# Patient Record
Sex: MALE | Race: WHITE | NOT HISPANIC OR LATINO
[De-identification: names, ages, dates, MRNs, and addresses within clinical notes are randomized per-mention and may not be internally consistent; named-entity substitution may affect disease eponyms.]

---

## 2023-05-24 PROBLEM — Z00.00 ENCOUNTER FOR PREVENTIVE HEALTH EXAMINATION: Status: ACTIVE | Noted: 2023-05-24

## 2023-05-31 ENCOUNTER — APPOINTMENT (OUTPATIENT)
Dept: ORTHOPEDIC SURGERY | Facility: CLINIC | Age: 68
End: 2023-05-31
Payer: MEDICARE

## 2023-05-31 VITALS — WEIGHT: 158 LBS | HEIGHT: 68 IN | BODY MASS INDEX: 23.95 KG/M2

## 2023-05-31 DIAGNOSIS — Z78.9 OTHER SPECIFIED HEALTH STATUS: ICD-10-CM

## 2023-05-31 PROCEDURE — 99204 OFFICE O/P NEW MOD 45 MIN: CPT

## 2023-05-31 PROCEDURE — 73080 X-RAY EXAM OF ELBOW: CPT | Mod: RT

## 2023-05-31 RX ORDER — DICLOFENAC SODIUM 1% 10 MG/G
1 GEL TOPICAL
Qty: 1 | Refills: 0 | Status: ACTIVE | COMMUNITY
Start: 2023-05-31 | End: 1900-01-01

## 2023-05-31 NOTE — PHYSICAL EXAM
[de-identified] : Neurovascularly intact distally \par \par Right Elbow:\par Tenderness over epicondyle. \par ROM pain with pronation.\par pain with supination. \par elbow pain with resisted wrist extension.\par elbow pain with resisted forearm supination.\par

## 2023-05-31 NOTE — HISTORY OF PRESENT ILLNESS
[de-identified] : The patient is a 67 year old R hand dominant M who presents today complaining of right elbow .   \par Date of Injury/Onset: intermittent for about 15 years \par Pain:    At Rest: 2/10  \par With Activity:  7/10  \par Mechanism of injury: no specific injury, reports might have strained it during pickle ball \par This is not a Work Related Injury being treated under Worker's Compensation. \par This is not an athletic injury occurring associated with an interscholastic or organized sports team. \par Quality of symptoms: swelling, aching \par Improves with: ice, advil \par Worse with: certain exercises \par Prior treatment: PT\par Prior Imaging: n/a\par Out of work/sport: _, since _ \par School/Sport/Position/Occupation:RETIRED \par Additional Information: hx of lateral epicondylitis \par

## 2023-05-31 NOTE — DISCUSSION/SUMMARY
[de-identified] : Reviewed all images with patient. \par Advised to use elbow brace on right elbow.\par Packet of exercises provided for home strengthening and/or stretching. \par Patient will follow up in 6 weeks, if not improved patient will consider PRP injection.\par \par \par \par -----------------------------------------------\par Home Exercise\par The patient is instructed on a home exercise program.\par \par RHONDA MOLINA Acting as a Scribe for Dr. Quinones\par I, Rhonda Molina, attest that this documentation has been prepared under the direction and in the presence of Provider Jean Carlos Quinones MD.\par \par Activity Modification\par The patient was advised to modify their activities.\par \par Dx / Natural History\par The patient was advised of the diagnosis.  The natural history of the pathology was explained in full to the patient in layman's terms.  Several different treatment options were discussed and explained in full to the patient including the risks and benefits of both surgical and non-surgical treatments.  All questions and concerns were answered.\par \par Pain Guide Activities\par The patient was advised to let pain guide the gradual advancement of activities.\par \par RICE\par I explained to the patient that rest, ice, compression, and elevation would benefit them.  They may return to activity after follow-up or when they no longer have any pain.\par \par The patient's current medication management of their orthopedic diagnosis was reviewed today:\par (1) We discussed a comprehensive treatment plan that included possible pharmaceutical management involving the use of prescription strength medications including but not limited to options such as oral Naprosyn 500mg BID, once daily Meloxicam 15 mg, or 500-650 mg Tylenol versus over the counter oral medications and topical prescription NSAID Pennsaid vs over the counter Voltaren gel.\par (2) There is a moderate risk of morbidity with further treatment, especially from use of prescription strength medications and possible side effects of these medications which include upset stomach with oral medications, skin reactions to topical medications and cardiac/renal issues with long term use.\par (3) I recommended that the patient follow-up with their medical physician to discuss any significant specific potential issues with long term medication use such as interactions with current medications or with exacerbation of underlying medical comorbidities.\par (4) The benefits and risks associated with use of injectable, oral or topical, prescription and over the counter anti-inflammatory medications were discussed with the patient. The patient voiced understanding of the risks including but not limited to bleeding, stroke, kidney dysfunction, heart disease, and were referred to the black box warning label for further information.

## 2023-06-21 ENCOUNTER — FORM ENCOUNTER (OUTPATIENT)
Age: 68
End: 2023-06-21

## 2023-06-22 ENCOUNTER — APPOINTMENT (OUTPATIENT)
Dept: MRI IMAGING | Facility: CLINIC | Age: 68
End: 2023-06-22
Payer: MEDICARE

## 2023-06-22 PROCEDURE — 73221 MRI JOINT UPR EXTREM W/O DYE: CPT | Mod: RT,MH

## 2023-07-05 ENCOUNTER — APPOINTMENT (OUTPATIENT)
Dept: ORTHOPEDIC SURGERY | Facility: CLINIC | Age: 68
End: 2023-07-05
Payer: MEDICARE

## 2023-07-05 VITALS — HEIGHT: 68 IN | WEIGHT: 158 LBS | BODY MASS INDEX: 23.95 KG/M2

## 2023-07-05 PROCEDURE — 99214 OFFICE O/P EST MOD 30 MIN: CPT

## 2023-07-05 NOTE — DATA REVIEWED
[FreeTextEntry1] : 5/31/23\par OC XRAY Right Elbow: 3 view:\par lat epicondyle small calfic\par \par 6/22/23\par OC MRI right elbow \par Impression: \par 1. Moderate-grade partial tearing of the common extensor tendon insertion measuring 7-8 mm with \par delamination and mild surrounding bursitis without retraction or atrophy.\par 2. Tiny subchondral cysts in the medial aspect of the radial head.

## 2023-07-05 NOTE — PHYSICAL EXAM
[de-identified] : Neurovascularly intact distally \par \par Right Elbow:\par Tenderness over epicondyle. \par ROM pain with pronation.\par pain with supination. \par elbow pain with resisted wrist extension.\par elbow pain with resisted forearm supination.\par

## 2023-07-05 NOTE — DISCUSSION/SUMMARY
[de-identified] : Discussed treatment options, the patient would like to follow through with PRP injections.\par Patient will schedule appointment to start the PRP injection series.\par  \par \par \par \par -----------------------------------------------\par Home Exercise\par The patient is instructed on a home exercise program.\par \par RHONDA MOLINA Acting as a Scribe for Dr. Quinones\par I, Rhonda Molina, attest that this documentation has been prepared under the direction and in the presence of Provider Jean Carlos Quinones MD.\par s\par Activity Modification\par The patient was advised to modify their activities.\par \par Dx / Natural History\par The patient was advised of the diagnosis.  The natural history of the pathology was explained in full to the patient in layman's terms.  Several different treatment options were discussed and explained in full to the patient including the risks and benefits of both surgical and non-surgical treatments.  All questions and concerns were answered.\par \par Pain Guide Activities\par The patient was advised to let pain guide the gradual advancement of activities.\par \par RICE\par I explained to the patient that rest, ice, compression, and elevation would benefit them.  They may return to activity after follow-up or when they no longer have any pain.\par \par The patient's current medication management of their orthopedic diagnosis was reviewed today:\par (1) We discussed a comprehensive treatment plan that included possible pharmaceutical management involving the use of prescription strength medications including but not limited to options such as oral Naprosyn 500mg BID, once daily Meloxicam 15 mg, or 500-650 mg Tylenol versus over the counter oral medications and topical prescription NSAID Pennsaid vs over the counter Voltaren gel.\par (2) There is a moderate risk of morbidity with further treatment, especially from use of prescription strength medications and possible side effects of these medications which include upset stomach with oral medications, skin reactions to topical medications and cardiac/renal issues with long term use.\par (3) I recommended that the patient follow-up with their medical physician to discuss any significant specific potential issues with long term medication use such as interactions with current medications or with exacerbation of underlying medical comorbidities.\par (4) The benefits and risks associated with use of injectable, oral or topical, prescription and over the counter anti-inflammatory medications were discussed with the patient. The patient voiced understanding of the risks including but not limited to bleeding, stroke, kidney dysfunction, heart disease, and were referred to the black box warning label for further information.

## 2023-07-19 ENCOUNTER — APPOINTMENT (OUTPATIENT)
Dept: ORTHOPEDIC SURGERY | Facility: CLINIC | Age: 68
End: 2023-07-19
Payer: MEDICARE

## 2023-07-19 VITALS — BODY MASS INDEX: 23.95 KG/M2 | WEIGHT: 158 LBS | HEIGHT: 68 IN

## 2023-07-19 PROCEDURE — 20611 DRAIN/INJ JOINT/BURSA W/US: CPT | Mod: RT

## 2023-07-19 PROCEDURE — 99214 OFFICE O/P EST MOD 30 MIN: CPT | Mod: 25

## 2023-07-19 PROCEDURE — A4565: CPT

## 2023-07-19 PROCEDURE — L3908: CPT | Mod: RT

## 2023-07-19 PROCEDURE — 005KIT: CUSTOM

## 2023-07-19 NOTE — PHYSICAL EXAM
[de-identified] : Neurovascularly intact distally \par \par Right Elbow:\par Tenderness over epicondyle. \par ROM pain with pronation.\par pain with supination. \par elbow pain with resisted wrist extension.\par elbow pain with resisted forearm supination.\par

## 2023-07-19 NOTE — DISCUSSION/SUMMARY
[de-identified] : Patient received 1 of 2 PRP injections today, well tolerated.\par Right elbow to be in sling and not in use. \par Patient is to follow up next week to received the second and last PRP injection.\par \par \par \par \par Right Elbow ultrasound guided lateral epicondyle PRP injection\par \par Platelet Rich Plasma (PRP) injection into the Lateral Epicondyle is recommended because of the severity of symptoms. The risks, benefits, and alternatives to PRP injection were explained in full to the patient. Risks outlined include but are not limited to infection, sepsis, bleeding, scarring, skin discoloration, temporary increase in pain, syncopal episode, failure to resolve symptoms, allergic reaction, symptom recurrence, and inflammation. Patient understood the risks. All questions were answered. After discussion of options, patient requested an injection. Oral informed consent was obtained and sterile prep was done of the injection site. 20cc of blood was obtained and placed in the ACP centrifuge. 5cc of PRP was drawn off using the dual syringe. Oral informed consent was obtained and sterile prep was done of the injection site. Sterile technique was used to introduce the mixture into the lateral epicondyle ECRB tendon under ultrasound guidance. Patient tolerated the procedure well. Advised to ice the injection site this evening.\par  \par \par \par -----------------------------------------------\par Home Exercise\par The patient is instructed on a home exercise program.\par \par RHONDA MOLINA Acting as a Scribe for Dr. Quinones\par I, Rhonda Molina, attest that this documentation has been prepared under the direction and in the presence of Provider Jean Carlos Quinones MD.\par s\par Activity Modification\par The patient was advised to modify their activities.\par \par Dx / Natural History\par The patient was advised of the diagnosis.  The natural history of the pathology was explained in full to the patient in layman's terms.  Several different treatment options were discussed and explained in full to the patient including the risks and benefits of both surgical and non-surgical treatments.  All questions and concerns were answered.\par \par Pain Guide Activities\par The patient was advised to let pain guide the gradual advancement of activities.\par \par RICE\par I explained to the patient that rest, ice, compression, and elevation would benefit them.  They may return to activity after follow-up or when they no longer have any pain.\par \par The patient's current medication management of their orthopedic diagnosis was reviewed today:\par (1) We discussed a comprehensive treatment plan that included possible pharmaceutical management involving the use of prescription strength medications including but not limited to options such as oral Naprosyn 500mg BID, once daily Meloxicam 15 mg, or 500-650 mg Tylenol versus over the counter oral medications and topical prescription NSAID Pennsaid vs over the counter Voltaren gel.\par (2) There is a moderate risk of morbidity with further treatment, especially from use of prescription strength medications and possible side effects of these medications which include upset stomach with oral medications, skin reactions to topical medications and cardiac/renal issues with long term use.\par (3) I recommended that the patient follow-up with their medical physician to discuss any significant specific potential issues with long term medication use such as interactions with current medications or with exacerbation of underlying medical comorbidities.\par (4) The benefits and risks associated with use of injectable, oral or topical, prescription and over the counter anti-inflammatory medications were discussed with the patient. The patient voiced understanding of the risks including but not limited to bleeding, stroke, kidney dysfunction, heart disease, and were referred to the black box warning label for further information.

## 2023-07-26 ENCOUNTER — APPOINTMENT (OUTPATIENT)
Dept: ORTHOPEDIC SURGERY | Facility: CLINIC | Age: 68
End: 2023-07-26
Payer: MEDICARE

## 2023-07-26 VITALS — BODY MASS INDEX: 23.95 KG/M2 | WEIGHT: 158 LBS | HEIGHT: 68 IN

## 2023-07-26 DIAGNOSIS — M25.521 PAIN IN RIGHT ELBOW: ICD-10-CM

## 2023-07-26 PROCEDURE — 20611 DRAIN/INJ JOINT/BURSA W/US: CPT | Mod: RT

## 2023-07-26 PROCEDURE — 005KIT: CUSTOM

## 2023-07-26 PROCEDURE — 99214 OFFICE O/P EST MOD 30 MIN: CPT | Mod: 25

## 2023-07-26 NOTE — DISCUSSION/SUMMARY
[de-identified] : Patient received 2 of 2 PRP injections today, well tolerated.\par One week rest and then pt.\par Physical therapy prescribed for strengthening and stretching.\par Patient will follow up in 6 weeks. \par \par \par \par \par Right Elbow ultrasound guided lateral epicondyle PRP injection\par \par Platelet Rich Plasma (PRP) injection into the Lateral Epicondyle is recommended because of the severity of symptoms. The risks, benefits, and alternatives to PRP injection were explained in full to the patient. Risks outlined include but are not limited to infection, sepsis, bleeding, scarring, skin discoloration, temporary increase in pain, syncopal episode, failure to resolve symptoms, allergic reaction, symptom recurrence, and inflammation. Patient understood the risks. All questions were answered. After discussion of options, patient requested an injection. Oral informed consent was obtained and sterile prep was done of the injection site. 20cc of blood was obtained and placed in the ACP centrifuge. 5cc of PRP was drawn off using the dual syringe. Oral informed consent was obtained and sterile prep was done of the injection site. Sterile technique was used to introduce the mixture into the lateral epicondyle ECRB tendon under ultrasound guidance. Patient tolerated the procedure well. Advised to ice the injection site this evening.\par  \par \par \par -----------------------------------------------\par Home Exercise\par The patient is instructed on a home exercise program.\par \par RHONDA MOLINA Acting as a Scribe for Dr. Quinones\par I, Rhonda Molina, attest that this documentation has been prepared under the direction and in the presence of Provider Jean Carlos Quinones MD.\par s\par Activity Modification\par The patient was advised to modify their activities.\par \par Dx / Natural History\par The patient was advised of the diagnosis.  The natural history of the pathology was explained in full to the patient in layman's terms.  Several different treatment options were discussed and explained in full to the patient including the risks and benefits of both surgical and non-surgical treatments.  All questions and concerns were answered.\par \par Pain Guide Activities\par The patient was advised to let pain guide the gradual advancement of activities.\par \par RICE\par I explained to the patient that rest, ice, compression, and elevation would benefit them.  They may return to activity after follow-up or when they no longer have any pain.\par \par The patient's current medication management of their orthopedic diagnosis was reviewed today:\par (1) We discussed a comprehensive treatment plan that included possible pharmaceutical management involving the use of prescription strength medications including but not limited to options such as oral Naprosyn 500mg BID, once daily Meloxicam 15 mg, or 500-650 mg Tylenol versus over the counter oral medications and topical prescription NSAID Pennsaid vs over the counter Voltaren gel.\par (2) There is a moderate risk of morbidity with further treatment, especially from use of prescription strength medications and possible side effects of these medications which include upset stomach with oral medications, skin reactions to topical medications and cardiac/renal issues with long term use.\par (3) I recommended that the patient follow-up with their medical physician to discuss any significant specific potential issues with long term medication use such as interactions with current medications or with exacerbation of underlying medical comorbidities.\par (4) The benefits and risks associated with use of injectable, oral or topical, prescription and over the counter anti-inflammatory medications were discussed with the patient. The patient voiced understanding of the risks including but not limited to bleeding, stroke, kidney dysfunction, heart disease, and were referred to the black box warning label for further information.

## 2023-07-26 NOTE — PHYSICAL EXAM
[de-identified] : Neurovascularly intact distally \par \par Right Elbow:\par Tenderness over epicondyle. \par ROM pain with pronation.\par pain with supination. \par elbow pain with resisted wrist extension.\par elbow pain with resisted forearm supination.\par

## 2023-08-22 ENCOUNTER — OFFICE (OUTPATIENT)
Dept: URBAN - METROPOLITAN AREA CLINIC 12 | Facility: CLINIC | Age: 68
Setting detail: OPHTHALMOLOGY
End: 2023-08-22
Payer: MEDICARE

## 2023-08-22 DIAGNOSIS — H25.13: ICD-10-CM

## 2023-08-22 DIAGNOSIS — H40.013: ICD-10-CM

## 2023-08-22 PROBLEM — H52.7 REFRACTIVE ERROR ; BOTH EYES: Status: ACTIVE | Noted: 2023-08-22

## 2023-08-22 PROCEDURE — 92004 COMPRE OPH EXAM NEW PT 1/>: CPT | Performed by: STUDENT IN AN ORGANIZED HEALTH CARE EDUCATION/TRAINING PROGRAM

## 2023-08-22 PROCEDURE — 92250 FUNDUS PHOTOGRAPHY W/I&R: CPT | Performed by: STUDENT IN AN ORGANIZED HEALTH CARE EDUCATION/TRAINING PROGRAM

## 2023-08-22 ASSESSMENT — REFRACTION_CURRENTRX
OD_OVR_VA: 20/
OD_CYLINDER: SPHERE
OS_ADD: +2.50
OS_OVR_VA: 20/
OD_AXIS: 0
OD_ADD: +2.50
OS_CYLINDER: -0.25
OS_SPHERE: +0.50
OD_SPHERE: +0.75
OD_VPRISM_DIRECTION: BF
OS_AXIS: 084
OS_VPRISM_DIRECTION: BF

## 2023-08-22 ASSESSMENT — REFRACTION_AUTOREFRACTION
OS_AXIS: 091
OS_SPHERE: +1.00
OD_SPHERE: +1.00
OD_CYLINDER: -0.25
OD_AXIS: 092
OS_CYLINDER: -0.50

## 2023-08-22 ASSESSMENT — SPHEQUIV_DERIVED
OS_SPHEQUIV: 0.75
OS_SPHEQUIV: 0.75
OS_SPHEQUIV: 2
OD_SPHEQUIV: 0.875
OS_SPHEQUIV: 0.75

## 2023-08-22 ASSESSMENT — CONFRONTATIONAL VISUAL FIELD TEST (CVF)
OD_FINDINGS: FULL
OS_FINDINGS: FULL

## 2023-08-22 ASSESSMENT — VISUAL ACUITY
OD_BCVA: 20/20
OS_BCVA: 20/20

## 2023-08-22 ASSESSMENT — REFRACTION_MANIFEST
OS_ADD: +2.50
OS_SPHERE: +1.00
OD_ADD: +1.25
OS_CYLINDER: -0.50
OD_CYLINDER: SPH
OS_AXIS: 090
OS_VA1: 20/20
OS_CYLINDER: -0.50
OS_SPHERE: +1.00
OS_CYLINDER: -0.50
OS_VA1: 20/20
OS_AXIS: 090
OD_AXIS: 000
OD_SPHERE: +0.75
OD_CYLINDER: SPH
OD_VA1: 20/20
OD_VA1: 20/20
OS_ADD: +1.25
OD_AXIS: 000
OD_ADD: +2.50
OD_CYLINDER: SPH
OS_ADD: +1.25
OD_ADD: +1.25
OD_SPHERE: +0.75
OD_VA1: 20/20
OS_SPHERE: +2.25
OS_AXIS: 090
OD_SPHERE: +2.00
OS_VA1: 20/20
OD_AXIS: 000

## 2023-08-22 ASSESSMENT — KERATOMETRY
OD_K1POWER_DIOPTERS: 45.00
OS_AXISANGLE_DEGREES: 168
OS_K1POWER_DIOPTERS: 44.75
OD_AXISANGLE_DEGREES: 060
OD_K2POWER_DIOPTERS: 45.50
OS_K2POWER_DIOPTERS: 45.00

## 2023-08-22 ASSESSMENT — AXIALLENGTH_DERIVED
OS_AL: 22.8207
OS_AL: 22.3739
OS_AL: 22.8207
OS_AL: 22.8207
OD_AL: 22.6476

## 2023-08-22 ASSESSMENT — TONOMETRY
OS_IOP_MMHG: 17
OD_IOP_MMHG: 16

## 2024-06-11 ENCOUNTER — OFFICE (OUTPATIENT)
Dept: URBAN - METROPOLITAN AREA CLINIC 12 | Facility: CLINIC | Age: 69
Setting detail: OPHTHALMOLOGY
End: 2024-06-11
Payer: MEDICARE

## 2024-06-11 DIAGNOSIS — H25.13: ICD-10-CM

## 2024-06-11 DIAGNOSIS — H40.013: ICD-10-CM

## 2024-06-11 DIAGNOSIS — R51.9: ICD-10-CM

## 2024-06-11 DIAGNOSIS — H52.7: ICD-10-CM

## 2024-06-11 PROCEDURE — 76514 ECHO EXAM OF EYE THICKNESS: CPT | Performed by: STUDENT IN AN ORGANIZED HEALTH CARE EDUCATION/TRAINING PROGRAM

## 2024-06-11 PROCEDURE — 92083 EXTENDED VISUAL FIELD XM: CPT | Performed by: STUDENT IN AN ORGANIZED HEALTH CARE EDUCATION/TRAINING PROGRAM

## 2024-06-11 PROCEDURE — 92014 COMPRE OPH EXAM EST PT 1/>: CPT | Performed by: STUDENT IN AN ORGANIZED HEALTH CARE EDUCATION/TRAINING PROGRAM

## 2024-06-11 PROCEDURE — 92133 CPTRZD OPH DX IMG PST SGM ON: CPT | Performed by: STUDENT IN AN ORGANIZED HEALTH CARE EDUCATION/TRAINING PROGRAM

## 2024-06-11 PROCEDURE — 92020 GONIOSCOPY: CPT | Performed by: STUDENT IN AN ORGANIZED HEALTH CARE EDUCATION/TRAINING PROGRAM

## 2024-06-11 ASSESSMENT — CONFRONTATIONAL VISUAL FIELD TEST (CVF)
OS_FINDINGS: FULL
OD_FINDINGS: FULL

## 2024-09-18 ENCOUNTER — APPOINTMENT (OUTPATIENT)
Dept: MRI IMAGING | Facility: CLINIC | Age: 69
End: 2024-09-18

## 2024-09-18 ENCOUNTER — APPOINTMENT (OUTPATIENT)
Dept: ORTHOPEDIC SURGERY | Facility: CLINIC | Age: 69
End: 2024-09-18

## 2024-09-18 DIAGNOSIS — Z00.00 ENCOUNTER FOR GENERAL ADULT MEDICAL EXAMINATION W/OUT ABNORMAL FINDINGS: ICD-10-CM

## 2024-09-18 DIAGNOSIS — M77.11 LATERAL EPICONDYLITIS, RIGHT ELBOW: ICD-10-CM

## 2024-09-18 PROCEDURE — 73010 X-RAY EXAM OF SHOULDER BLADE: CPT | Mod: LT

## 2024-09-18 PROCEDURE — 20611 DRAIN/INJ JOINT/BURSA W/US: CPT | Mod: LT

## 2024-09-18 PROCEDURE — 99214 OFFICE O/P EST MOD 30 MIN: CPT | Mod: 25

## 2024-09-18 PROCEDURE — 73080 X-RAY EXAM OF ELBOW: CPT | Mod: 50

## 2024-09-18 PROCEDURE — 73030 X-RAY EXAM OF SHOULDER: CPT | Mod: LT

## 2024-09-18 NOTE — HISTORY OF PRESENT ILLNESS
[de-identified] : 09/18/2024: The patient is a 69 year old M, right hand dominant who presents today complaining of bilateral elbow and LT shoulder pain.  Date of Injury/Onset: Left- 6-8 months, Right- 2023, had prior intermittent pain for about 16 years  Pain:  At Rest: 6/10 With Activity:  7-10/10 Mechanism of injury: Gradual onset  This is not a Work Related Injury being treated under Worker's Compensation. This is not an athletic injury occurring associated with an interscholastic or organized sports team. Quality of symptoms: Tender, swelling, turmeric supplement,   Improves with: Advil,  Worse with: Push ups, weight bearing, sleeping  Prior treatment: PRP x2, PT- right elbow, Denies any prior treatment for the left elbow.  Prior imaging: XR of right elbow- 05/31/23 MRI- right elbow 06/22/23 Previous injury: None  Out of work/sport: kayaking and working out has been delayed due to the pain.  School/Sport/Position/Occupation: Retired  Additional Information: HX of left shoulder separation on 11/25/22 patient fell while playing pickleball. Plays pickleball, bike ride, walking/ hiking,

## 2024-09-18 NOTE — DISCUSSION/SUMMARY
[de-identified] : Bilateral X-Ray Examination of the ELBOW 3 views: no fractures, subluxations or dislocations.   Assessment:   The patient is a 69 year old male with physical exam findings consistent with BILATERAL LATERAL EPICONDYLITIS    - We discussed their diagnosis and treatment options at length including the fact that the majority of patients get better at one year; however, for those small percentage that don't, they can be helped with a surgical intervention. - We will move forward with icing, and anti-inflammatory medication  - They will modify their activities over the next 4-6 weeks. - We also discussed the possibility of a corticosteroid injection and will move forward with this for the LT ELBOW today. Patient tolerated well.  - Patient discussed wanting SX in RT ELBOW due to LE and bone spur in RT ELBOW only and will consider this but decided to wait at this time.  - R/B/A to surgical and non-surgical interventions were discussed in length.  - MRI ordered for RT ELBOW   - Follow up with MRI        LEFT shoulder X-ray examination of  (3-4 views): no fractures or dislocations;  AC joint arthrosis; Type 2 acromion; subacromial spur; joint space narrowing; inferior humeral head osteophyte    Assessment: The patient is a 69 year old male with physical exam findings consistent with LEFT SHOULDER OSTEOARTHRITIS/ROTATOR CUFF ARTHROPATHY, *HAMADA GRADE 1A/1B/2A/2B*     The natural progression of osteoarthritis was explained to the patient.  We discussed the possible treatment options from conservative to operative.  These included NSAIDS, Glucosamine and Chondrotin sulfate, and Physical Therapy as well different types of injections.  We also discussed that at some point they may progress to needing a TSA.  Information and pamphlets were given.     We discussed their diagnosis and treatment options at length including surgical and non-surgical options. We will first attempt conservative treatment with activity modification, HEP, icing, weight loss, and anti-inflammatory medications. We discussed the possible of injections (steroid and Visco supplementation) in the future. The patient was provided with a HEP to work on ROM, RTC strengthening, shoulder stretches and strengthening, and other exercises on the Shoulder Arthritis Protocol.     Dx / Natural History: The patient was advised of the diagnosis.  The natural history of the pathology was explained in full to the patient in layman's terms.  Several different treatment options were discussed and explained in full to the patient including the risks and benefits of both surgical and non-surgical treatments.  All questions and concerns were answered.       Pain Guide Activities: The patient was advised to let pain guide the gradual advancement of activities.          Icing: The patient was advised to apply ice (wrapped in a towel or protective covering) to the area daily (20 minutes at a time, 2-4X/day).       Follow up in 4-6 weeks to re-evaluate.

## 2024-09-18 NOTE — IMAGING
[de-identified] : LEFT SHOULDER Inspection: No swelling.  Palpation: Tenderness is noted at the bicipital groove, anterior and lateral.  Range of motion: There is pain with range of motion. , ER 55, @90ER 90, @90IR 30 Strength: There is pain, weakness, and discomfort with strength testing. Forward Flexion 3/5. Abduction 3/5. External Rotation 4/5 and Internal Rotation 5-/5  Neurological testings: motor and sensor intact distally. Ligament Stability and Special Tests:  There is positive arc of pain.  Shoulder apprehension: neg Shoulder relocation: neg Obriens test: pos Biceps Active test: neg Rivas Labral Shear: neg Impingement testing: pos Martha testing: pos Whipple: pos Cross Body Adduction: neg  LEFT Shoulder  Inspection: Scapula Winging: Negative Deformity: None Erythema: None Ecchymosis: None Abrasions: None Effusion: Mild Crepitus: Moderate   Range of Motion: Active Forward Flexion:110 degrees Passive Forward Flexion:130 degrees Active IR : back pocket Active ER :30 degrees   Motor Exam: Forward Flexion: 4+ out of 5 Flexion Plane of Scapula: 5 out of 5 Abduction: 4+ out of 5 Internal Rotation: 5 out of 5 External Rotation: 4+ out of 5 Distal Motor Strength: 5 out of 5   Stability Testing: Anterior: 1+ Posterior: 1+ Sulcus N: 1+ Sulcus ER: 1+   Provocative Tests: Drop Arm: Negative Bliss/Impingement: Positive [place]: Positive X-Arm Adduction: Negative Belly Press: Negative Bear Hug: Negative Lift Off: Negative Apprehension: Negative Relocation: Negative Posterior Load & Shift: Negative   Palpation: AC Joint: Nontender Clavicle: Nontender SC Joint: Nontender Bicepital Groove: Positive Coracoid Process: Positive Pectoralis Minor Tendon: Nontender Pectoralis Major Tendon: Nontender & palpably intact Latissimus Dorsi: Nontender Proximal Humerus: Positive Scapula Body: Nontender Medial Scapula Boarder: Nontender Scapula Spine: Nontender   Neurologic Exam: Sensation to Light Touch: Axillary: Grossly intact Ulnar: Grossly intact Radial: Grossly intact Median: Grossly intact Other:  N/A   Circulatory/Pulses: Ulnar: 2+ Radial: 2+ Other Pertinent Findings: None   BILATERAL ELBOW Inspection: Swelling Palpation: Tenderness over lateral epicondyle.  Range of Motion: Full elbow flexion and elbow extension.  Strength: flexion strength 5/5, extension strength 5/5, pronation strength 5/5 and supination  strength 5/5  Neurological testing: motor exam 5/5 and light touch intact.  Ligament Stability and Special Testing:  Lateral elbow pain with resisted wrist extension

## 2024-09-18 NOTE — PROCEDURE
[FreeTextEntry3] : Patient Identification Андрей Holbrook / [birth year]: Verbal with patient and/or family   Procedure Verification: Procedure confirmed with patient or family/designee Consent for procedure: Verbal Consent Given Relevant documentation completed, reviewed, and signed Clinical indications for procedure confirmed   Time-out with all members of procedure team immediately prior to procedure: Correct patient identified. Agreement on procedure. Correct side and site.   ULTRASOUND GUIDED SHOULDER SUBACROMIAL INJECTION - LEFT  After verbal consent and identification of the correct patient and correct site, the posterior LEFT  elbow were prepped using alcohol. This was allowed time to air dry. After ethyl chloride spray for skin anesthesia, a mixture of 1cc Celestone 6mg/ml, 3cc Lidocaine 1%, and 3cc Bupivacaine 0.5% was injected under ultrasound guidance into the subacromial space of both shoulders from posterior using a sterile 22G needle. Visualization of the needle and placement of each injection was performed without any complications. Ultrasound was used for visualization, precise injection in area of tear, and / or prior failure or difficult injection. The patient tolerated the procedure well. After-care instructions were provided and included instructions to ice the area and to call if redness, pain, or fever develop.

## 2024-09-19 DIAGNOSIS — M19.012 PRIMARY OSTEOARTHRITIS, LEFT SHOULDER: ICD-10-CM

## 2024-09-19 DIAGNOSIS — M77.12 LATERAL EPICONDYLITIS, LEFT ELBOW: ICD-10-CM

## 2024-09-25 ENCOUNTER — APPOINTMENT (OUTPATIENT)
Dept: ORTHOPEDIC SURGERY | Facility: CLINIC | Age: 69
End: 2024-09-25

## 2024-10-04 PROBLEM — M19.012 PRIMARY OSTEOARTHRITIS OF LEFT SHOULDER: Status: ACTIVE | Noted: 2024-09-19

## 2025-06-11 ENCOUNTER — OFFICE (OUTPATIENT)
Dept: URBAN - METROPOLITAN AREA CLINIC 12 | Facility: CLINIC | Age: 70
Setting detail: OPHTHALMOLOGY
End: 2025-06-11
Payer: MEDICARE

## 2025-06-11 DIAGNOSIS — H40.013: ICD-10-CM

## 2025-06-11 DIAGNOSIS — R51.9: ICD-10-CM

## 2025-06-11 DIAGNOSIS — H25.13: ICD-10-CM

## 2025-06-11 PROBLEM — B88.0 ACARIASIS, INFESTATION OF MITES AND OR TICS: Status: ACTIVE | Noted: 2025-06-11

## 2025-06-11 PROCEDURE — 92014 COMPRE OPH EXAM EST PT 1/>: CPT | Performed by: STUDENT IN AN ORGANIZED HEALTH CARE EDUCATION/TRAINING PROGRAM

## 2025-06-11 PROCEDURE — 92083 EXTENDED VISUAL FIELD XM: CPT | Performed by: STUDENT IN AN ORGANIZED HEALTH CARE EDUCATION/TRAINING PROGRAM

## 2025-06-11 ASSESSMENT — REFRACTION_MANIFEST
OS_ADD: +1.25
OD_SPHERE: +0.75
OD_VA1: 20/20
OS_ADD: +1.25
OS_AXIS: 090
OS_CYLINDER: -0.50
OD_CYLINDER: SPH
OS_VA1: 20/20
OS_VA1: 20/20
OD_CYLINDER: SPH
OD_ADD: +2.50
OS_AXIS: 090
OD_AXIS: 000
OS_SPHERE: +1.00
OD_SPHERE: +0.75
OD_SPHERE: +2.00
OD_VA1: 20/20
OS_VA1: 20/20
OD_AXIS: 000
OS_AXIS: 090
OS_SPHERE: +1.00
OD_ADD: +1.25
OD_VA1: 20/20
OS_SPHERE: +2.25
OS_CYLINDER: -0.50
OD_ADD: +1.25
OD_AXIS: 000
OS_CYLINDER: -0.50
OD_CYLINDER: SPH
OS_ADD: +2.50

## 2025-06-11 ASSESSMENT — REFRACTION_CURRENTRX
OS_AXIS: 094
OD_SPHERE: +1.00
OS_ADD: +1.25
OD_SPHERE: +0.75
OS_OVR_VA: 20/
OS_SPHERE: +0.50
OS_CYLINDER: -0.25
OS_ADD: +2.50
OS_AXIS: 86
OD_AXIS: 149
OS_OVR_VA: 20/
OS_CYLINDER: -0.25
OD_ADD: +2.50
OD_AXIS: 000
OD_CYLINDER: -0.25
OD_CYLINDER: SPH
OD_ADD: +1.25
OD_OVR_VA: 20/
OD_VPRISM_DIRECTION: BF
OS_SPHERE: +0.50
OS_VPRISM_DIRECTION: BF
OD_OVR_VA: 20/

## 2025-06-11 ASSESSMENT — REFRACTION_AUTOREFRACTION
OD_AXIS: 000
OS_AXIS: 060
OD_SPHERE: +1.00
OS_SPHERE: +1.00
OD_CYLINDER: SPH
OS_CYLINDER: -0.25

## 2025-06-11 ASSESSMENT — KERATOMETRY
OD_K2POWER_DIOPTERS: 45.75
OD_AXISANGLE_DEGREES: 073
OD_K1POWER_DIOPTERS: 45.25
OS_K2POWER_DIOPTERS: 45.25
OS_AXISANGLE_DEGREES: 097
OS_K1POWER_DIOPTERS: 45.00

## 2025-06-11 ASSESSMENT — CONFRONTATIONAL VISUAL FIELD TEST (CVF)
OS_FINDINGS: FULL
OD_FINDINGS: FULL

## 2025-06-11 ASSESSMENT — LID EXAM ASSESSMENTS
OD_COMMENTS: COLLARETTES
OS_COMMENTS: COLLARETTES

## 2025-06-11 ASSESSMENT — TONOMETRY
OD_IOP_MMHG: 17
OS_IOP_MMHG: 17

## 2025-06-11 ASSESSMENT — PACHYMETRY
OS_CT_UM: 558
OS_CT_CORRECTION: -1
OD_CT_UM: 565
OD_CT_CORRECTION: -1

## 2025-06-11 ASSESSMENT — VISUAL ACUITY
OS_BCVA: 20/20-2
OD_BCVA: 20/20-